# Patient Record
Sex: FEMALE | Race: WHITE | URBAN - METROPOLITAN AREA
[De-identification: names, ages, dates, MRNs, and addresses within clinical notes are randomized per-mention and may not be internally consistent; named-entity substitution may affect disease eponyms.]

---

## 2017-08-06 ENCOUNTER — TRANSFERRED RECORDS (OUTPATIENT)
Dept: HEALTH INFORMATION MANAGEMENT | Facility: CLINIC | Age: 62
End: 2017-08-06

## 2017-08-21 ENCOUNTER — TRANSFERRED RECORDS (OUTPATIENT)
Dept: HEALTH INFORMATION MANAGEMENT | Facility: CLINIC | Age: 62
End: 2017-08-21

## 2017-08-22 ENCOUNTER — TRANSFERRED RECORDS (OUTPATIENT)
Dept: HEALTH INFORMATION MANAGEMENT | Facility: CLINIC | Age: 62
End: 2017-08-22

## 2017-08-23 ENCOUNTER — TRANSFERRED RECORDS (OUTPATIENT)
Dept: HEALTH INFORMATION MANAGEMENT | Facility: CLINIC | Age: 62
End: 2017-08-23

## 2017-08-29 ENCOUNTER — TRANSFERRED RECORDS (OUTPATIENT)
Dept: HEALTH INFORMATION MANAGEMENT | Facility: CLINIC | Age: 62
End: 2017-08-29

## 2017-09-01 ENCOUNTER — TRANSFERRED RECORDS (OUTPATIENT)
Dept: HEALTH INFORMATION MANAGEMENT | Facility: CLINIC | Age: 62
End: 2017-09-01

## 2017-09-03 ENCOUNTER — TRANSFERRED RECORDS (OUTPATIENT)
Dept: HEALTH INFORMATION MANAGEMENT | Facility: CLINIC | Age: 62
End: 2017-09-03

## 2017-09-05 ENCOUNTER — TRANSFERRED RECORDS (OUTPATIENT)
Dept: HEALTH INFORMATION MANAGEMENT | Facility: CLINIC | Age: 62
End: 2017-09-05

## 2017-09-06 ENCOUNTER — TRANSFERRED RECORDS (OUTPATIENT)
Dept: HEALTH INFORMATION MANAGEMENT | Facility: CLINIC | Age: 62
End: 2017-09-06

## 2017-09-08 ENCOUNTER — TRANSFERRED RECORDS (OUTPATIENT)
Dept: HEALTH INFORMATION MANAGEMENT | Facility: CLINIC | Age: 62
End: 2017-09-08

## 2017-09-12 ENCOUNTER — TELEPHONE (OUTPATIENT)
Dept: ORTHOPEDICS | Facility: CLINIC | Age: 62
End: 2017-09-12

## 2017-09-12 ENCOUNTER — MEDICAL CORRESPONDENCE (OUTPATIENT)
Dept: HEALTH INFORMATION MANAGEMENT | Facility: CLINIC | Age: 62
End: 2017-09-12

## 2017-09-12 NOTE — TELEPHONE ENCOUNTER
Jose RN, states patient has post-op appointment with her ortho surgeon, Dr Toledo, on Thursday/Sept 14th.  Office: [phone]  Jose will also be scheduling appointment with Dr Nataly Spangler, Neuro Surgeon  PH: [phone]  PCP Claudette Ta PH: [phone]  Our call center will contact patient on cell: [phone] for demographic information.  Phylicia will see Dr Valentino, Oct 3rd at 9:20 am, for post-op visit and establish care.
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
## 2017-09-12 NOTE — TELEPHONE ENCOUNTER
Call came to triage from Jose in [place] where pt has been seen in the past and he states pt has op notes and images on disk that she will be hand carrying to her clinic appt with Dr Valentino on 10/3/17.

## 2017-09-14 ENCOUNTER — TRANSFERRED RECORDS (OUTPATIENT)
Dept: HEALTH INFORMATION MANAGEMENT | Facility: CLINIC | Age: 62
End: 2017-09-14

## 2017-09-15 ENCOUNTER — TRANSFERRED RECORDS (OUTPATIENT)
Dept: HEALTH INFORMATION MANAGEMENT | Facility: CLINIC | Age: 62
End: 2017-09-15

## 2017-09-27 ASSESSMENT — ENCOUNTER SYMPTOMS
CONSTIPATION: 0
COUGH DISTURBING SLEEP: 0
POLYDIPSIA: 0
NAUSEA: 0
BOWEL INCONTINENCE: 0
FLANK PAIN: 0
WEIGHT LOSS: 0
DIFFICULTY URINATING: 1
STIFFNESS: 0
CHILLS: 0
MUSCLE WEAKNESS: 0
BACK PAIN: 1
JAUNDICE: 0
NECK PAIN: 1
ABDOMINAL PAIN: 0
POLYPHAGIA: 0
JOINT SWELLING: 0
HEMOPTYSIS: 0
HALLUCINATIONS: 0
MUSCLE CRAMPS: 0
NIGHT SWEATS: 0
RECTAL BLEEDING: 0
RECTAL PAIN: 0
COUGH: 0
HEARTBURN: 0
FATIGUE: 0
SPUTUM PRODUCTION: 0
WHEEZING: 0
ALTERED TEMPERATURE REGULATION: 0
DYSPNEA ON EXERTION: 0
SHORTNESS OF BREATH: 1
DECREASED APPETITE: 0
MYALGIAS: 0
SNORES LOUDLY: 0
VOMITING: 0
POSTURAL DYSPNEA: 0
BLOATING: 0
WEIGHT GAIN: 0
BLOOD IN STOOL: 0
RESPIRATORY PAIN: 1
DYSURIA: 1
ARTHRALGIAS: 1
INCREASED ENERGY: 0
FEVER: 0
DIARRHEA: 0

## 2017-09-28 ASSESSMENT — ENCOUNTER SYMPTOMS
WHEEZING: 0
RESPIRATORY PAIN: 1
SPUTUM PRODUCTION: 0
JAUNDICE: 0
HEMOPTYSIS: 0
BLOATING: 0
MYALGIAS: 0
ARTHRALGIAS: 1
HEARTBURN: 0
BACK PAIN: 1
SHORTNESS OF BREATH: 0
HEMATURIA: 0
MUSCLE WEAKNESS: 0
DYSURIA: 1
DIARRHEA: 0
COUGH: 0
RECTAL PAIN: 0
CONSTIPATION: 0
BOWEL INCONTINENCE: 0
FLANK PAIN: 0
ABDOMINAL PAIN: 1
STIFFNESS: 0
NAUSEA: 0
POSTURAL DYSPNEA: 0
DYSPNEA ON EXERTION: 0
BLOOD IN STOOL: 0
VOMITING: 0
MUSCLE CRAMPS: 0
NECK PAIN: 1
COUGH DISTURBING SLEEP: 0
JOINT SWELLING: 0
DIFFICULTY URINATING: 1
RECTAL BLEEDING: 0

## 2017-09-30 ENCOUNTER — HEALTH MAINTENANCE LETTER (OUTPATIENT)
Age: 62
End: 2017-09-30

## 2017-10-02 ENCOUNTER — TRANSFERRED RECORDS (OUTPATIENT)
Dept: HEALTH INFORMATION MANAGEMENT | Facility: CLINIC | Age: 62
End: 2017-10-02

## 2017-10-02 ENCOUNTER — OFFICE VISIT (OUTPATIENT)
Dept: NEUROSURGERY | Facility: CLINIC | Age: 62
End: 2017-10-02

## 2017-10-02 VITALS
HEART RATE: 95 BPM | SYSTOLIC BLOOD PRESSURE: 141 MMHG | WEIGHT: 118 LBS | DIASTOLIC BLOOD PRESSURE: 84 MMHG | HEIGHT: 63 IN | BODY MASS INDEX: 20.91 KG/M2

## 2017-10-02 DIAGNOSIS — S32.010A CLOSED WEDGE COMPRESSION FRACTURE OF FIRST LUMBAR VERTEBRA, INITIAL ENCOUNTER: ICD-10-CM

## 2017-10-02 DIAGNOSIS — S12.9XXA CLOSED FRACTURE OF TRANSVERSE PROCESS OF CERVICAL VERTEBRA, INITIAL ENCOUNTER (H): Primary | ICD-10-CM

## 2017-10-02 RX ORDER — L.ACID,FERM,PLA,RHA/B.BIF,LONG 126 MG
1 TABLET, DELAYED AND EXTENDED RELEASE ORAL DAILY
COMMUNITY

## 2017-10-02 RX ORDER — ACYCLOVIR 200 MG/1
200 CAPSULE ORAL DAILY
COMMUNITY

## 2017-10-02 ASSESSMENT — PAIN SCALES - GENERAL: PAINLEVEL: NO PAIN (0)

## 2017-10-02 NOTE — MR AVS SNAPSHOT
After Visit Summary   10/2/2017    Phylicia Ramirez    MRN: [id number]           Patient Information     Date Of Birth          1955        Visit Information        Provider Department      10/2/2017 11:15 AM Tresa Willis PA-C [facility] Neurosurgery        Today's Diagnoses     Closed fracture of transverse process of cervical vertebra, initial encounter (H)    -  1    Closed wedge compression fracture of first lumbar vertebra, initial encounter (H)           Follow-ups after your visit        Follow-up notes from your care team     Return in about 6 weeks (around 11/13/2017).      Your next 10 appointments already scheduled     Oct 10, 2017  7:50 AM CDT   (Arrive by 7:35 AM)   NEW FOOT/ANKLE with Kemal Valentino MD   [facility] ([facility])    [address] [place] [id number]   [phone]            Nov 14, 2017 11:15 AM CST   (Arrive by 11:00 AM)   XR CERVICAL SPINE 2/3 VIEWS with UCXR1   [facility] ([facility])    [address] [place] [id number] [phone]           Please bring a list of your current medicines to your exam. (Include vitamins, minerals and over-thecounter medicines.) Leave your valuables at home.  Tell your doctor if there is a chance you may be pregnant.  You do not need to do anything special for this exam.            Nov 14, 2017 11:35 AM CST   (Arrive by 11:20 AM)   XR LUMBAR SPINE 2/3 VIEWS with UCXR1   [facility] ([facility])    [address] [place] [id number] [phone]           Please bring a list of your current medicines to your exam. (Include vitamins, minerals and over-thecounter medicines.) Leave your valuables at home.  Tell your doctor if there is a chance you may be pregnant.  You do not need to do anything special for this exam.             Nov 14, 2017 12:00 PM CST   (Arrive by 11:45 AM)   Return Visit with Tresa Willis PA-C   [facility] ([facility])    [address] [id number] [phone]              Future tests that were ordered for you today     Open Future Orders        Priority Expected Expires Ordered    X-ray Cervical spine 2-3 views (AP and lateral) [IMG56] Routine 10/2/2017 10/2/2018 10/2/2017    X-ray Lumbar spine 2-3 views (AP and lateral - standing views preferred) [IMG66] Routine 10/2/2017 10/2/2018 10/2/2017            Who to contact     Please call your clinic at [phone] to:    Ask questions about your health    Make or cancel appointments    Discuss your medicines    Learn about your test results    Speak to your doctor   If you have compliments or concerns about an experience at your clinic, or if you wish to file a complaint, please contact [facility] Patient Relations at [phone] or email us at [email or web address]         Additional Information About Your Visit        [email or web address] Information     [employer] gives you secure access to your electronic health record. If you see a primary care provider, you can also send messages to your care team and make appointments. If you have questions, please call your primary care clinic.  If you do not have a primary care provider, please call [phone] and they will assist you.      [employer] is an electronic gateway that provides easy, online access to your medical records. With [employer], you can request a clinic appointment, read your test results, renew a prescription or communicate with your care team.     To access your existing account, please contact your [facility] or call [phone] for assistance.        Care EveryWhere ID     This is your Care EveryWhere ID. This could be used by other organizations to access your [facility]  "records  [id number]        Your Vitals Were     Pulse Height BMI (Body Mass Index)             95 1.588 m (5' 2.5\") 21.24 kg/m2          Blood Pressure from Last 3 Encounters:   10/02/17 141/84    Weight from Last 3 Encounters:   10/02/17 53.5 kg (118 lb)              We Performed the Following     X-ray Cervical spine 2-3 views (AP and lateral) [IMG56]        Primary Care Provider Office Phone # Fax #    [facility] [phone]       [facility] [address] [place] [address]        Equal Access to Services     [facility] AH: Hadii aad ku hadasho Soomaali, waaxda luqadaha, qaybta kaalmada adeegyada, waxnicolasa ramosin hayestellan adeeg ketan laTutuaan . So [facility] [phone].    ATENCIÓN: Si habla español, tiene a barnes disposición servicios gratuitos de asistencia lingüística. Llame al [phone].    We comply with applicable federal civil rights laws and [place] laws. We do not discriminate on the basis of race, color, national origin, age, disability, sex, sexual orientation, or gender identity.            Thank you!     Thank you for choosing [facility]  for your care. Our goal is always to provide you with excellent care. Hearing back from our patients is one way we can continue to improve our services. Please take a few minutes to complete the written survey that you may receive in the mail after your visit with us. Thank you!             Your Updated Medication List - Protect others around you: Learn how to safely use, store and throw away your medicines at www.disposemymeds.org.          This list is accurate as of: 10/2/17  2:04 PM.  Always use your most recent med list.                   Brand Name Dispense Instructions for use Diagnosis    Acetaminophen 500 MG Tbdp           acyclovir 200 MG capsule    ZOVIRAX          Aspirin 81 MG Tbdp           D-MANNOSE PO           Probiotic Tbec           vitamin C CR 1000 MG Tbcr             "
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
## 2017-10-03 DIAGNOSIS — S92.001A CLOSED DISPLACED FRACTURE OF RIGHT CALCANEUS, UNSPECIFIED PORTION OF CALCANEUS, INITIAL ENCOUNTER: Primary | ICD-10-CM

## 2017-10-10 ENCOUNTER — OFFICE VISIT (OUTPATIENT)
Dept: ORTHOPEDICS | Facility: CLINIC | Age: 62
End: 2017-10-10

## 2017-10-10 DIAGNOSIS — S92.001A CLOSED DISPLACED FRACTURE OF RIGHT CALCANEUS, UNSPECIFIED PORTION OF CALCANEUS, INITIAL ENCOUNTER: Primary | ICD-10-CM

## 2017-10-10 DIAGNOSIS — M25.571 PAIN IN JOINT, ANKLE AND FOOT, RIGHT: ICD-10-CM

## 2017-10-10 DIAGNOSIS — S12.9XXA CLOSED FRACTURE OF TRANSVERSE PROCESS OF CERVICAL VERTEBRA, INITIAL ENCOUNTER (H): Primary | ICD-10-CM

## 2017-10-10 NOTE — MR AVS SNAPSHOT
After Visit Summary   10/10/2017    Phylicia Ramirez    MRN: [id number]           Patient Information     Date Of Birth          1955        Visit Information        Provider Department      10/10/2017 7:50 AM Kemal Valentino MD [facility]        Today's Diagnoses     Closed displaced fracture of right calcaneus, unspecified portion of calcaneus, initial encounter    -  1    Pain in joint, ankle and foot, right           Follow-ups after your visit        Your next 10 appointments already scheduled     Nov 08, 2017  8:00 AM CST   (Arrive by 7:45 AM)   CT NECK ANGIO W/O & W CONTRAST with UCCT1   [facility] CT ([facility])    [address]  1st Floor  [place] [address] [phone]           Please bring any scans or X-rays taken at other hospitals, if similar tests were done. Also bring a list of your medicines, including vitamins, minerals and over-the-counter drugs. It is safest to leave personal items at home.  Be sure to tell your doctor:   If you have any allergies.   If there s any chance you are pregnant.   If you are breastfeeding.   If you have any special needs.  You will have contrast for this exam. To prepare:   Do not eat or drink for 2 hours before your exam. If you need to take medicine, you may take it with small sips of water. (We may ask you to take liquid medicine as well.)   The day before your exam, drink extra fluids at least six 8-ounce glasses (unless your doctor tells you to restrict your fluids).  Patients over 70 or patients with diabetes or kidney problems:   If you haven t had a blood test (creatinine test) within the last 30 days, go to your clinic or Diagnostic Imaging Department for this test.  If you have diabetes:   If your kidney function is normal, continue taking your metformin (Avandamet, Glucophage, Glucovance, Metaglip) on the day of your exam.   If your kidney function is abnormal,  wait 48 hours before restarting this medicine.  Please wear loose clothing, such as a sweat suit or jogging clothes. Avoid snaps, zippers and other metal. We may ask you to undress and put on a hospital gown.  If you have any questions, please call the Imaging Department where you will have your exam.            Nov 08, 2017  8:30 AM CST   (Arrive by 8:15 AM)   XR CERVICAL SPINE 2/3 VIEWS with UCXR1   [facility] ([facility])    [address] [id number] [phone]           Please bring a list of your current medicines to your exam. (Include vitamins, minerals and over-thecounter medicines.) Leave your valuables at home.  Tell your doctor if there is a chance you may be pregnant.  You do not need to do anything special for this exam.            Nov 08, 2017  8:40 AM CST   (Arrive by 8:25 AM)   XR LUMBAR SPINE 2/3 VIEWS with UCXR1   [facility] ([facility])    [address] [id number] [phone]           Please bring a list of your current medicines to your exam. (Include vitamins, minerals and over-thecounter medicines.) Leave your valuables at home.  Tell your doctor if there is a chance you may be pregnant.  You do not need to do anything special for this exam.            Nov 09, 2017 12:00 PM CST   (Arrive by 11:45 AM)   Return Visit with Tresa Willis PA-C   [facility] ([facility])    [address] [place] [address]   [phone]            Nov 21, 2017  8:00 AM CST   CT LOWER EXTREMITY RIGHT W/O CONTRAST with UCCT1   [facility] ([facility])    [address] [id number] [phone]           Please bring any scans or X-rays taken at other hospitals, if similar tests were done. Also bring a list of your medicines,  including vitamins, minerals and over-the-counter drugs. It is safest to leave personal items at home.  Be sure to tell your doctor:   If you have any allergies.   If there s any chance you are pregnant.   If you are breastfeeding.   If you have any special needs.  You do not need to do anything special to prepare.  Please wear loose clothing, such as a sweat suit or jogging clothes. Avoid snaps, zippers and other metal. We may ask you to undress and put on a hospital gown.            Nov 21, 2017  9:10 AM CST   (Arrive by 8:55 AM)   RETURN FOOT/ANKLE with Kemal Valentino MD   [facility] ([facility])    [address]  4th Floor  [facility] [address] [phone]              Who to contact     Please call your clinic at [phone] to:    Ask questions about your health    Make or cancel appointments    Discuss your medicines    Learn about your test results    Speak to your doctor   If you have compliments or concerns about an experience at your clinic, or if you wish to file a complaint, please contact [facility] Patient Relations at [phone] or email us at [email or web address]         Additional Information About Your Visit        [employer] Information     BandPage gives you secure access to your electronic health record. If you see a primary care provider, you can also send messages to your care team and make appointments. If you have questions, please call your primary care clinic.  If you do not have a primary care provider, please call [phone] and they will assist you.      BandPage is an electronic gateway that provides easy, online access to your medical records. With [employer]Page, you can request a clinic appointment, read your test results, renew a prescription or communicate with your care team.     To access your existing account, please contact your [facility] or call  [phone] for assistance.        Care EveryWhere ID     This is your Care EveryWhere ID. This could be used by other organizations to access your [place] medical records  [id number]         Blood Pressure from Last 3 Encounters:   10/02/17 141/84    Weight from Last 3 Encounters:   10/02/17 53.5 kg (118 lb)               Primary Care Provider Office Phone # Fax #    [facility] [phone]       [facility] [address] [place] [address]        Equal Access to Services     [facility]: Hadii aad ku hadasho Soomaali, waaxda luqadaha, qaybta kaalmada adeegyada, waxay idiin hayaan adeeg kharash laTutuaan . So [facility] [phone].    ATENCIÓN: Si habla español, tiene a barnes disposición servicios gratuitos de asistencia lingüística. [facility] [phone].    We comply with applicable federal civil rights laws and [place] laws. We do not discriminate on the basis of race, color, national origin, age, disability, sex, sexual orientation, or gender identity.            Thank you!     Thank you for choosing [facility]  for your care. Our goal is always to provide you with excellent care. Hearing back from our patients is one way we can continue to improve our services. Please take a few minutes to complete the written survey that you may receive in the mail after your visit with us. Thank you!             Your Updated Medication List - Protect others around you: Learn how to safely use, store and throw away your medicines at www.disposemymeds.org.          This list is accurate as of: 10/10/17 11:59 PM.  Always use your most recent med list.                   Brand Name Dispense Instructions for use Diagnosis    Acetaminophen 500 MG Tbdp           acyclovir 200 MG capsule    ZOVIRAX          Aspirin 81 MG Tbdp           D-MANNOSE PO           Probiotic Tbec           vitamin C CR 1000 MG Tbcr
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
## 2017-10-10 NOTE — NURSING NOTE
Reason For Visit:   Chief Complaint   Patient presents with     Musculoskeletal Problem     Right calcaneus fx ORIF. DOS 9/1/17           Pain Assessment  Patient Currently in Pain: No
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
## 2017-11-08 ENCOUNTER — TELEPHONE (OUTPATIENT)
Dept: NEUROSURGERY | Facility: CLINIC | Age: 62
End: 2017-11-08

## 2017-11-08 NOTE — TELEPHONE ENCOUNTER
"Neurosurgery Telephone Note    Was paged regarding Phylicia Ramirez's CT cervical spine findings by reading radiologist with increased distraction of C1 fracture. The patient was contacted by home and mobile telephone with no answer. A voicemail message was left for the patient on her mobile phone noting the urgent findings of her CT and the need to return to the Emergency Department for further management.     Carl \"Naren\" MD Joanie   Neurosurgery, PGY-1    Please contact neurosurgery resident on call with questions.    Dial * * [phone], enter [phone] when prompted.     "

## 2017-11-09 ENCOUNTER — OFFICE VISIT (OUTPATIENT)
Dept: NEUROSURGERY | Facility: CLINIC | Age: 62
End: 2017-11-09

## 2017-11-09 VITALS
OXYGEN SATURATION: 96 % | DIASTOLIC BLOOD PRESSURE: 83 MMHG | HEART RATE: 92 BPM | HEIGHT: 63 IN | SYSTOLIC BLOOD PRESSURE: 160 MMHG | TEMPERATURE: 98.2 F | WEIGHT: 120.5 LBS | RESPIRATION RATE: 20 BRPM | BODY MASS INDEX: 21.35 KG/M2

## 2017-11-09 DIAGNOSIS — S32.010A CLOSED WEDGE COMPRESSION FRACTURE OF FIRST LUMBAR VERTEBRA, INITIAL ENCOUNTER: ICD-10-CM

## 2017-11-09 DIAGNOSIS — S12.9XXA CLOSED FRACTURE OF TRANSVERSE PROCESS OF CERVICAL VERTEBRA, INITIAL ENCOUNTER (H): Primary | ICD-10-CM

## 2017-11-09 PROBLEM — S12.000A C1 CERVICAL FRACTURE (H): Status: ACTIVE | Noted: 2017-08-22

## 2017-11-09 PROBLEM — N39.0 UTI (URINARY TRACT INFECTION): Status: ACTIVE | Noted: 2017-08-29

## 2017-11-09 PROBLEM — S32.000A LUMBAR COMPRESSION FRACTURE (H): Status: ACTIVE | Noted: 2017-08-26

## 2017-11-09 PROBLEM — S22.49XA MULTIPLE RIB FRACTURES: Status: ACTIVE | Noted: 2017-08-21

## 2017-11-09 PROBLEM — S82.009A PATELLAR FRACTURE: Status: ACTIVE | Noted: 2017-08-22

## 2017-11-09 PROBLEM — Z63.4 DEATH OF HUSBAND: Status: ACTIVE | Noted: 2017-08-26
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
## 2017-11-09 PROBLEM — B00.9: Status: ACTIVE | Noted: 2017-11-09

## 2017-11-09 PROBLEM — S72.413A FEMORAL CONDYLE FRACTURE (H): Status: ACTIVE | Noted: 2017-08-22

## 2017-11-09 PROBLEM — J93.9 PNEUMOTHORAX: Status: ACTIVE | Noted: 2017-08-21

## 2017-11-09 PROBLEM — T07.XXXA MULTIPLE LACERATIONS: Status: ACTIVE | Noted: 2017-08-21

## 2017-11-09 PROBLEM — S92.109A TALUS FRACTURE: Status: ACTIVE | Noted: 2017-08-22

## 2017-11-09 PROBLEM — K66.8 PNEUMOPERITONEUM: Status: ACTIVE | Noted: 2017-08-21

## 2017-11-09 PROBLEM — N39.0 RECURRENT UTI: Status: ACTIVE | Noted: 2017-11-09

## 2017-11-09 PROBLEM — S92.002A LEFT CALCANEAL FRACTURE: Status: ACTIVE | Noted: 2017-08-22
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
## 2017-11-09 ASSESSMENT — PAIN SCALES - GENERAL: PAINLEVEL: NO PAIN (0)
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
## 2017-11-09 NOTE — MR AVS SNAPSHOT
After Visit Summary   11/9/2017    Phylicia Ramirez    MRN: [id number]           Patient Information     Date Of Birth          1955        Visit Information        Provider Department      11/9/2017 12:00 PM Tresa Willis PA-C [facility] Neurosurgery        Today's Diagnoses     Closed fracture of transverse process of cervical vertebra, initial encounter (H)    -  1    Closed wedge compression fracture of first lumbar vertebra, initial encounter (H)           Follow-ups after your visit        Follow-up notes from your care team     Return if symptoms worsen or fail to improve.      Your next 10 appointments already scheduled     Nov 21, 2017  6:00 PM CST   CT LOWER EXTREMITY RIGHT W/O CONTRAST with UCCT1   [facility] [place] [facility] ([facility])    [address] [place] [address] [phone]           Please bring any scans or X-rays taken at other hospitals, if similar tests were done. Also bring a list of your medicines, including vitamins, minerals and over-the-counter drugs. It is safest to leave personal items at home.  Be sure to tell your doctor:   If you have any allergies.   If there s any chance you are pregnant.   If you are breastfeeding.   If you have any special needs.  You do not need to do anything special to prepare.  Please wear loose clothing, such as a sweat suit or jogging clothes. Avoid snaps, zippers and other metal. We may ask you to undress and put on a hospital gown.            Nov 21, 2017  6:40 PM CST   (Arrive by 6:25 PM)   RETURN FOOT/ANKLE with Kemal Valentino MD   [facility] ([facility])    [address] [place] [address]  [address] [facility] [address] [phone]              Who to contact     Please call your clinic at [phone] to:    Ask questions about your health    Make or cancel appointments    Discuss your medicines    Learn about your test  "results    Speak to your doctor   If you have compliments or concerns about an experience at your clinic, or if you wish to file a complaint, please contact [facility] Patient Relations at [phone] or email us at [email or web address]         Additional Information About Your Visit        [employer] Information     [employer] gives you secure access to your electronic health record. If you see a primary care provider, you can also send messages to your care team and make appointments. If you have questions, please call your primary care clinic.  If you do not have a primary care provider, please call [phone] and they will assist you.      [employer] is an electronic gateway that provides easy, online access to your medical records. With [employer], you can request a clinic appointment, read your test results, renew a prescription or communicate with your care team.     To access your existing account, please contact your [facility] or call [phone] for assistance.        Care EveryWhere ID     This is your Care EveryWhere ID. This could be used by other organizations to access your [place] medical records  [id number]        Your Vitals Were     Pulse Temperature Respirations Height Pulse Oximetry Breastfeeding?    92 98.2  F (36.8  C) 20 1.588 m (5' 2.5\") 96% No    BMI (Body Mass Index)                   21.69 kg/m2            Blood Pressure from Last 3 Encounters:   11/09/17 160/83   10/02/17 141/84    Weight from Last 3 Encounters:   11/09/17 54.7 kg (120 lb 8 oz)   10/02/17 53.5 kg (118 lb)              We Performed the Following     X-ray Cervical spine G/E 5 views (AP, lateral, odontoid, flexion, extension) [IMG59]        Primary Care Provider Office Phone # Fax #    [facility] [phone]       [facility] [address] [address]        Equal Access to Services     LATONYA JARAMILLO AH: Hadii aad ku " bautista Veras, cindy ramirezadaha, qaoliviata kaalcides mg, bella rachelin hayaan reneyoung zekenel laTutulucy carmella. So [facility] [phone].    ATENCIÓN: Si habla español, tiene a barnes disposición servicios gratuitos de asistencia lingüística. Sadnrine al [phone].    We comply with applicable federal civil rights laws and [place] laws. We do not discriminate on the basis of race, color, national origin, age, disability, sex, sexual orientation, or gender identity.            Thank you!     Thank you for choosing [facility] NEUROSURGERY  for your care. Our goal is always to provide you with excellent care. Hearing back from our patients is one way we can continue to improve our services. Please take a few minutes to complete the written survey that you may receive in the mail after your visit with us. Thank you!             Your Updated Medication List - Protect others around you: Learn how to safely use, store and throw away your medicines at www.disposemymeds.org.          This list is accurate as of: 11/9/17  4:08 PM.  Always use your most recent med list.                   Brand Name Dispense Instructions for use Diagnosis    Acetaminophen 500 MG Tbdp      Take 1-2 tablets by mouth as needed        acyclovir 200 MG capsule    ZOVIRAX     Take 200 mg by mouth daily        Aspirin 81 MG Tbdp      Take 1 tablet by mouth daily        D-MANNOSE PO      Take 2 tablets by mouth daily        Probiotic Tbec      Take 1 capsule by mouth daily        vitamin C CR 1000 MG Tbcr      Take 1 tablet by mouth daily

## 2017-11-09 NOTE — TELEPHONE ENCOUNTER
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 There does not appear to be evidence of bony contents in the spinal canal at the C1 level despite evidence of increased distraction of the fractures. She attested that there was no evidence of neurologic deficit. As such, she was instructed to keep her follow-up appointment with Maureen Willis.     Carl \"Naren\" MD Joanie   Neurosurgery, PGY-1    Please contact neurosurgery resident on call with questions.    Dial * * [phone], enter [phone] when prompted.     "

## 2017-11-21 ENCOUNTER — OFFICE VISIT (OUTPATIENT)
Dept: ORTHOPEDICS | Facility: CLINIC | Age: 62
End: 2017-11-21

## 2017-11-21 DIAGNOSIS — M25.571 PAIN IN JOINT, ANKLE AND FOOT, RIGHT: Primary | ICD-10-CM

## 2017-11-21 NOTE — MR AVS SNAPSHOT
After Visit Summary   11/21/2017    Phylicia Ramirez    MRN: [id number]           Patient Information     Date Of Birth          1955        Visit Information        Provider Department      11/21/2017 6:40 PM Kemal Valentino MD [facility]        Today's Diagnoses     Pain in joint, ankle and foot, right    -  1       Follow-ups after your visit        Follow-up notes from your care team     Return in about 1 month (around 12/21/2017).      Your next 10 appointments already scheduled     Dec 19, 2017  3:40 PM CST   (Arrive by 3:25 PM)   RETURN FOOT/ANKLE with Kemal Valentino MD   [facility] ([facility])    [address] [address] [phone]              Who to contact     Please call your clinic at [phone] to:    Ask questions about your health    Make or cancel appointments    Discuss your medicines    Learn about your test results    Speak to your doctor   If you have compliments or concerns about an experience at your clinic, or if you wish to file a complaint, please contact [facility] Patient Relations at [phone] or email us at [email or web address]         Additional Information About Your Visit        MyChart Information     [employer] gives you secure access to your electronic health record. If you see a primary care provider, you can also send messages to your care team and make appointments. If you have questions, please call your primary care clinic.  If you do not have a primary care provider, please call [phone] and they will assist you.      [employer] is an electronic gateway that provides easy, online access to your medical records. With [employer], you can request a clinic appointment, read your test results, renew a prescription or communicate with your care team.     To access your existing account, please contact your  [facility] or call [phone] for assistance.        Care EveryWhere ID     This is your Care EveryWhere ID. This could be used by other organizations to access your [place] medical records  [id number]         Blood Pressure from Last 3 Encounters:   11/09/17 160/83   10/02/17 141/84    Weight from Last 3 Encounters:   11/09/17 54.7 kg (120 lb 8 oz)   10/02/17 53.5 kg (118 lb)              Today, you had the following     No orders found for display       Primary Care Provider Office Phone # Fax #    [facility] [phone]       [facility] [address] [place] [address]        Equal Access to Services     ARAMIS JARAMILLO : Hadii aad ku hadasho Soomaali, waaxda luqadaha, qaybta kaalmada adeegyada, waxay idiin hayaan madison kharaalexander mccarthy . So [facility] [phone].    ATENCIÓN: Si habla español, tiene a barnes disposición servicios gratuitos de asistencia lingüística. [facility] [phone].    We comply with applicable federal civil rights laws and [place] laws. We do not discriminate on the basis of race, color, national origin, age, disability, sex, sexual orientation, or gender identity.            Thank you!     Thank you for choosing [facility]TH[facility]  for your care. Our goal is always to provide you with excellent care. Hearing back from our patients is one way we can continue to improve our services. Please take a few minutes to complete the written survey that you may receive in the mail after your visit with us. Thank you!             Your Updated Medication List - Protect others around you: Learn how to safely use, store and throw away your medicines at www.disposemymeds.org.          This list is accurate as of: 11/21/17 11:59 PM.  Always use your most recent med list.                   Brand Name Dispense Instructions for use Diagnosis    Acetaminophen 500 MG Tbdp      Take 1-2 tablets by mouth as needed        acyclovir 200 MG  capsule    ZOVIRAX     Take 200 mg by mouth daily        Aspirin 81 MG Tbdp      Take 1 tablet by mouth daily        D-MANNOSE PO      Take 2 tablets by mouth daily        Probiotic Tbec      Take 1 capsule by mouth daily        vitamin C CR 1000 MG Tbcr      Take 1 tablet by mouth daily
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
## 2017-11-22 NOTE — PROGRESS NOTES
CHIEF COMPLAINT:  Status post right calcaneus open reduction internal fixation performed by an outside provider on 09/01/2017.      HISTORY OF PRESENT ILLNESS:  Ms. Ramirez presents today for further followup in the accompaniment of her daughter.  Denies to have any problems or complaints.  Reports to be compliant.      IMAGING:  A CT scan has been obtained prior to today's visit which is significant for showing further consolidation but still some radiolucency across the calcaneal tuberosity itself.      PHYSICAL EXAMINATION:  On today's exam, she presented with a very good and unremarkable exam.  Presents for full range of motion of the ankle joint.  There is some diffuse swelling across the foot.  There are no signs of infection or inflammation.      ASSESSMENT:  Status post right calcaneus open reduction internal fixation.      PLAN:  I discussed with the patient and her daughter that at this point I think it would be a good idea to start with some weightbearing; however, it is very important not to push through the pain.  The patient will proceed with the use of the CAM walker for comfort purposes around the house and at all times while being outdoors.      The patient will proceed with physical therapy after the next appointment.  The next appointment will be in 4 weeks from now.  At that time, 2 views of the right calcaneus will be obtained.      All questions were answered.  The patient was pleased with discussion.      TT 15 minutes, CT 10 minutes.
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
## 2017-12-14 DIAGNOSIS — S92.011D CLOSED DISPLACED FRACTURE OF BODY OF RIGHT CALCANEUS WITH ROUTINE HEALING, SUBSEQUENT ENCOUNTER: Primary | ICD-10-CM

## 2017-12-19 ENCOUNTER — RADIANT APPOINTMENT (OUTPATIENT)
Dept: GENERAL RADIOLOGY | Facility: CLINIC | Age: 62
End: 2017-12-19
Attending: ORTHOPAEDIC SURGERY
Payer: COMMERCIAL

## 2017-12-19 ENCOUNTER — OFFICE VISIT (OUTPATIENT)
Dept: ORTHOPEDICS | Facility: CLINIC | Age: 62
End: 2017-12-19
Payer: COMMERCIAL

## 2017-12-19 VITALS — WEIGHT: 120 LBS | HEIGHT: 63 IN | BODY MASS INDEX: 21.26 KG/M2

## 2017-12-19 DIAGNOSIS — M25.571 PAIN IN JOINT, ANKLE AND FOOT, RIGHT: Primary | ICD-10-CM

## 2017-12-19 DIAGNOSIS — S92.011D CLOSED DISPLACED FRACTURE OF BODY OF RIGHT CALCANEUS WITH ROUTINE HEALING, SUBSEQUENT ENCOUNTER: ICD-10-CM

## 2017-12-19 RX ORDER — AMLODIPINE BESYLATE 10 MG/1
10 TABLET ORAL
COMMUNITY
Start: 2017-12-14 | End: 2018-12-14

## 2017-12-19 NOTE — MR AVS SNAPSHOT
After Visit Summary   12/19/2017    Phylicia Ramirez    MRN: [id number]           Patient Information     Date Of Birth          1955        Visit Information        Provider Department      12/19/2017 3:40 PM Kemal Valentino MD [facility]        Today's Diagnoses     Pain in joint, ankle and foot, right    -  1       Follow-ups after your visit        Additional Services     PHYSICAL THERAPY REFERRAL (External-Prints)       Physical Therapy Referral                  Your next 10 appointments already scheduled     Dec 27, 2017  9:40 AM CST   (Arrive by 9:25 AM)   CHRISTIANE Extremity with Angel Eubanks PT   [facility] [place] ([place]ST[facility]E [place]  )    [address]  [address]  [place] [address]   [phone]            Jan 03, 2018  9:30 AM CST   CHRISTIANE Extremity with Jayesh Begum PT   [facility] [place] ([facility]STIANE [place]  )    [address]  [address]  [place] [address]   [phone]              Who to contact     Please call your clinic at [phone] to:    Ask questions about your health    Make or cancel appointments    Discuss your medicines    Learn about your test results    Speak to your doctor   If you have compliments or concerns about an experience at your clinic, or if you wish to file a complaint, please contact [facility] Patient Relations at [phone] or email us at [email or web address]         Additional Information About Your Visit        MyChart Information     [employer] gives you secure access to your electronic health record. If you see a primary care provider, you can also send messages to your care team and make appointments. If you have questions, please call your primary care clinic.  If you do not have a primary care provider, please call [phone] and they will assist you.      [employer] is an electronic gateway that provides easy, online access to your  "medical records. With [employer], you can request a clinic appointment, read your test results, renew a prescription or communicate with your care team.     To access your existing account, please contact your [facility] or call [phone] for assistance.        Care EveryWhere ID     This is your Care EveryWhere ID. This could be used by other organizations to access your [place] medical records  [id number]        Your Vitals Were     Height BMI (Body Mass Index)                1.588 m (5' 2.5\") 21.6 kg/m2           Blood Pressure from Last 3 Encounters:   No data found for BP    Weight from Last 3 Encounters:   No data found for Wt              Today, you had the following     No orders found for display         Today's Medication Changes          These changes are accurate as of: 12/19/17 11:59 PM.  If you have any questions, ask your nurse or doctor.               Start taking these medicines.        Dose/Directions    order for DME   Used for:  Pain in joint, ankle and foot, right   Started by:  Kemal Valentino MD        Jobst Compression stockings 15mm Hg   2 pair   Quantity:  2 Units   Refills:  0            Where to get your medicines      Some of these will need a paper prescription and others can be bought over the counter.  Ask your nurse if you have questions.     Bring a paper prescription for each of these medications     order for DME                Primary Care Provider Office Phone # Fax #    [facility] [phone]       [facility] [address] [place] [place] [address]        Equal Access to Services     [facility] AH: Hadii aad ku hadasho Soomaali, waaxda luqadaha, qaybta kaalmada adeegyada, bella mccarthy ah. So [facility] [phone].    ATENCIÓN: Si habla español, tiene a barnes disposición servicios gratuitos de asistencia lingüística. Llame al [phone].    We comply with applicable federal civil rights " laws and [place] laws. We do not discriminate on the basis of race, color, national origin, age, disability, sex, sexual orientation, or gender identity.            Thank you!     Thank you for choosing [facility]  for your care. Our goal is always to provide you with excellent care. Hearing back from our patients is one way we can continue to improve our services. Please take a few minutes to complete the written survey that you may receive in the mail after your visit with us. Thank you!             Your Updated Medication List - Protect others around you: Learn how to safely use, store and throw away your medicines at www.disposemymeds.org.          This list is accurate as of: 12/19/17 11:59 PM.  Always use your most recent med list.                   Brand Name Dispense Instructions for use Diagnosis    Acetaminophen 500 MG Tbdp      Take 1-2 tablets by mouth as needed        acyclovir 200 MG capsule    ZOVIRAX     Take 200 mg by mouth daily        * AMLODIPINE BESYLATE PO           * amLODIPine 10 MG tablet    NORVASC     Take 10 mg by mouth        Aspirin 81 MG Tbdp      Take 1 tablet by mouth daily        D-MANNOSE PO      Take 2 tablets by mouth daily        order for DME     2 Units    Jobst Compression stockings 15mm Hg   2 pair    Pain in joint, ankle and foot, right       Probiotic Tbec      Take 1 capsule by mouth daily        vitamin C CR 1000 MG Tbcr      Take 1 tablet by mouth daily        * Notice:  This list has 2 medication(s) that are the same as other medications prescribed for you. Read the directions carefully, and ask your doctor or other care provider to review them with you.

## 2017-12-19 NOTE — LETTER
12/19/2017       RE: Phylicia Ramirez  [address]  [place] [address]     Dear Colleague,    Thank you for referring your patient, Phylicia Ramirez, to the [facility] at [facility]. Please see a copy of my visit note below.    CHIEF COMPLAINT:  Status post right calcaneus open reduction internal fixation performed in 09/01/2017 by an outside provider.      HISTORY OF PRESENT ILLNESS:  Mrs. Ramirez presents today for further followup.  Reports to be doing well.  Reports to be ambulating without crutches except for long distances.      PHYSICAL EXAMINATION:  On today's visit, she continues having some swelling diffusely through the forefoot and hindfoot area.  Range of motion of the ankle is within normal limits.      RADIOGRAPHIC EVALUATION:  Two views of the right calcaneus were obtained today which were significant for showing still some radiolucency at the level of the fracture line.  Otherwise, she presents with a well-healed fracture with no further collapse when compared to the x-rays from 10/10.      ASSESSMENT:  Status post right calcaneus open reduction internal fixation.      PLAN:  I discussed with the patient to continue with activities as tolerated.  The patient will follow up on a p.r.n. basis.  The patient was encouraged to proceed with the use of compression stockings as well as a prescription for physical therapy for gait training.      The patient was encouraged to visit with us in 2-3 months from today if she does not like the function of her right foot.      TT 15 minutes, CT 10 minutes.             Again, thank you for allowing me to participate in the care of your patient.      Sincerely,    Kemal Valentino MD

## 2017-12-19 NOTE — NURSING NOTE
Reason For Visit:   Chief Complaint   Patient presents with     Surgical Followup     The patient is following up today after a right calcaneus ORIF DOS 9/1/17.       ?  No    Date of surgery: 9/1/17  Type of surgery: ORIF right calcaneus      Pain Assessment  Patient Currently in Pain: Denies

## 2017-12-19 NOTE — PROGRESS NOTES
CHIEF COMPLAINT:  Status post right calcaneus open reduction internal fixation performed in 09/01/2017 by an outside provider.      HISTORY OF PRESENT ILLNESS:  Mrs. Ramirez presents today for further followup.  Reports to be doing well.  Reports to be ambulating without crutches except for long distances.      PHYSICAL EXAMINATION:  On today's visit, she continues having some swelling diffusely through the forefoot and hindfoot area.  Range of motion of the ankle is within normal limits.      RADIOGRAPHIC EVALUATION:  Two views of the right calcaneus were obtained today which were significant for showing still some radiolucency at the level of the fracture line.  Otherwise, she presents with a well-healed fracture with no further collapse when compared to the x-rays from 10/10.      ASSESSMENT:  Status post right calcaneus open reduction internal fixation.      PLAN:  I discussed with the patient to continue with activities as tolerated.  The patient will follow up on a p.r.n. basis.  The patient was encouraged to proceed with the use of compression stockings as well as a prescription for physical therapy for gait training.      The patient was encouraged to visit with us in 2-3 months from today if she does not like the function of her right foot.      TT 15 minutes, CT 10 minutes.

## 2017-12-27 ENCOUNTER — THERAPY VISIT (OUTPATIENT)
Dept: PHYSICAL THERAPY | Facility: CLINIC | Age: 62
End: 2017-12-27
Payer: COMMERCIAL

## 2017-12-27 DIAGNOSIS — Z47.89 AFTERCARE FOLLOWING SURGERY OF THE MUSCULOSKELETAL SYSTEM: ICD-10-CM

## 2017-12-27 DIAGNOSIS — M25.571 PAIN IN JOINT INVOLVING ANKLE AND FOOT, RIGHT: Primary | ICD-10-CM

## 2017-12-27 PROCEDURE — 97161 PT EVAL LOW COMPLEX 20 MIN: CPT | Mod: GP | Performed by: PHYSICAL THERAPIST

## 2017-12-27 PROCEDURE — 97110 THERAPEUTIC EXERCISES: CPT | Mod: GP | Performed by: PHYSICAL THERAPIST
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
## 2018-01-03 ENCOUNTER — THERAPY VISIT (OUTPATIENT)
Dept: PHYSICAL THERAPY | Facility: CLINIC | Age: 63
End: 2018-01-03
Payer: COMMERCIAL

## 2018-01-03 DIAGNOSIS — M25.571 PAIN IN JOINT INVOLVING ANKLE AND FOOT, RIGHT: ICD-10-CM

## 2018-01-03 DIAGNOSIS — Z47.89 AFTERCARE FOLLOWING SURGERY OF THE MUSCULOSKELETAL SYSTEM: ICD-10-CM

## 2018-01-03 PROCEDURE — 97110 THERAPEUTIC EXERCISES: CPT | Mod: GP | Performed by: PHYSICAL THERAPIST

## 2018-01-03 PROCEDURE — 97112 NEUROMUSCULAR REEDUCATION: CPT | Mod: GP | Performed by: PHYSICAL THERAPIST

## 2018-01-10 ENCOUNTER — THERAPY VISIT (OUTPATIENT)
Dept: PHYSICAL THERAPY | Facility: CLINIC | Age: 63
End: 2018-01-10
Payer: COMMERCIAL

## 2018-01-10 DIAGNOSIS — M25.571 PAIN IN JOINT INVOLVING ANKLE AND FOOT, RIGHT: ICD-10-CM

## 2018-01-10 DIAGNOSIS — Z47.89 AFTERCARE FOLLOWING SURGERY OF THE MUSCULOSKELETAL SYSTEM: ICD-10-CM

## 2018-01-10 PROCEDURE — 97112 NEUROMUSCULAR REEDUCATION: CPT | Mod: GP | Performed by: PHYSICAL THERAPIST

## 2018-01-10 PROCEDURE — 97110 THERAPEUTIC EXERCISES: CPT | Mod: GP | Performed by: PHYSICAL THERAPIST

## 2018-01-15 ENCOUNTER — THERAPY VISIT (OUTPATIENT)
Dept: PHYSICAL THERAPY | Facility: CLINIC | Age: 63
End: 2018-01-15
Payer: COMMERCIAL

## 2018-01-15 DIAGNOSIS — M25.571 PAIN IN JOINT INVOLVING ANKLE AND FOOT, RIGHT: ICD-10-CM

## 2018-01-15 DIAGNOSIS — Z47.89 AFTERCARE FOLLOWING SURGERY OF THE MUSCULOSKELETAL SYSTEM: ICD-10-CM

## 2018-01-15 PROCEDURE — 97110 THERAPEUTIC EXERCISES: CPT | Mod: GP | Performed by: PHYSICAL THERAPIST

## 2018-01-15 PROCEDURE — 97112 NEUROMUSCULAR REEDUCATION: CPT | Mod: GP | Performed by: PHYSICAL THERAPIST

## 2018-01-24 ENCOUNTER — THERAPY VISIT (OUTPATIENT)
Dept: PHYSICAL THERAPY | Facility: CLINIC | Age: 63
End: 2018-01-24
Payer: COMMERCIAL

## 2018-01-24 DIAGNOSIS — M25.571 PAIN IN JOINT INVOLVING ANKLE AND FOOT, RIGHT: ICD-10-CM

## 2018-01-24 DIAGNOSIS — Z47.89 AFTERCARE FOLLOWING SURGERY OF THE MUSCULOSKELETAL SYSTEM: ICD-10-CM

## 2018-01-24 PROCEDURE — 97110 THERAPEUTIC EXERCISES: CPT | Mod: GP | Performed by: PHYSICAL THERAPIST

## 2018-01-24 PROCEDURE — 97112 NEUROMUSCULAR REEDUCATION: CPT | Mod: GP | Performed by: PHYSICAL THERAPIST

## 2018-01-31 ENCOUNTER — THERAPY VISIT (OUTPATIENT)
Dept: PHYSICAL THERAPY | Facility: CLINIC | Age: 63
End: 2018-01-31
Payer: COMMERCIAL

## 2018-01-31 DIAGNOSIS — Z47.89 AFTERCARE FOLLOWING SURGERY OF THE MUSCULOSKELETAL SYSTEM: ICD-10-CM

## 2018-01-31 DIAGNOSIS — M25.571 PAIN IN JOINT INVOLVING ANKLE AND FOOT, RIGHT: ICD-10-CM

## 2018-01-31 PROCEDURE — 97110 THERAPEUTIC EXERCISES: CPT | Mod: GP | Performed by: PHYSICAL THERAPIST

## 2018-01-31 PROCEDURE — 97112 NEUROMUSCULAR REEDUCATION: CPT | Mod: GP | Performed by: PHYSICAL THERAPIST

## 2018-02-21 ENCOUNTER — THERAPY VISIT (OUTPATIENT)
Dept: PHYSICAL THERAPY | Facility: CLINIC | Age: 63
End: 2018-02-21
Payer: COMMERCIAL

## 2018-02-21 DIAGNOSIS — M25.571 PAIN IN JOINT INVOLVING ANKLE AND FOOT, RIGHT: ICD-10-CM

## 2018-02-21 DIAGNOSIS — Z47.89 AFTERCARE FOLLOWING SURGERY OF THE MUSCULOSKELETAL SYSTEM: ICD-10-CM

## 2018-02-21 PROCEDURE — 97110 THERAPEUTIC EXERCISES: CPT | Mod: GP | Performed by: PHYSICAL THERAPIST

## 2018-02-21 PROCEDURE — 97112 NEUROMUSCULAR REEDUCATION: CPT | Mod: GP | Performed by: PHYSICAL THERAPIST

## 2018-02-21 NOTE — PROGRESS NOTES
Discharge Note    Progress reporting period is from initial eval to Feb 21, 2018.     Phylicia failed to return for next follow up visit and current status is unknown.  Please see information below for last relevant information on current status.  Patient seen for 7 visits.  SUBJECTIVE  Subjective changes noted by patient:  see note  .  Current pain level is  .     Previous pain level was   .   Changes in function:  Yes (See Goal flowsheet attached for changes in current functional level)  Adverse reaction to treatment or activity: None    OBJECTIVE  Gait:    Assistive Devices:  minimally antalgic, no AD  Ankle AROM:    Dorsiflexion: Left:    Right:   10  Plantarflexion: Left:     Right:  50  Inversion: Left:      Right:  25  Eversion:     Right:  15  Ankle Strength:    Dorsiflexion:  Right: 5/5    Pain:  Plantarflexion: Right: 4+/5   Pain  Inversion:Right: 5-/5   Pain:  Eversion:Right: 5-/5   Pain:  Knee ROM:    Extension: Left:    Right:  WNL  Flexion: Left:   Right: WNL  Knee Strength:   Extension:  Right: 5-/5    Pain:  Flexion:  Right: 5-/5    Pain:-    Quad Set Right: Good    Pain:    ASSESSMENT/PLAN  Diagnosis: ORIF R Calcaneus and Patella   DIAGP:  Diagnoses of Pain in joint involving ankle and foot, right and Aftercare following surgery of the musculoskeletal system were pertinent to this visit.  STG/LTGs have been met or progress has been made towards goals:  Yes, please see goal flowsheet for most current information  Assessment of Progress: current status is unknown.    Last current status:     Self Management Plans:  HEP  I have re-evaluated this patient and find that the nature, scope, duration and intensity of the therapy is appropriate for the medical condition of the patient.  Phylicia continues to require the following intervention to meet STG and LTG's:  HEP.    Recommendations:  Discharge with current home program.  Patient to follow up with MD as needed.    Please refer to the daily flowsheet for  treatment today, total treatment time and time spent performing 1:1 timed codes.

## 2019-10-02 ENCOUNTER — HEALTH MAINTENANCE LETTER (OUTPATIENT)
Age: 64
End: 2019-10-02

## 2019-10-30 ENCOUNTER — HEALTH MAINTENANCE LETTER (OUTPATIENT)
Age: 64
End: 2019-10-30

## 2019-12-15 ENCOUNTER — HEALTH MAINTENANCE LETTER (OUTPATIENT)
Age: 64
End: 2019-12-15

## 2021-01-15 ENCOUNTER — HEALTH MAINTENANCE LETTER (OUTPATIENT)
Age: 66
End: 2021-01-15

## 2021-09-04 ENCOUNTER — HEALTH MAINTENANCE LETTER (OUTPATIENT)
Age: 66
End: 2021-09-04

## 2021-10-30 ENCOUNTER — HEALTH MAINTENANCE LETTER (OUTPATIENT)
Age: 66
End: 2021-10-30

## 2022-02-19 ENCOUNTER — HEALTH MAINTENANCE LETTER (OUTPATIENT)
Age: 67
End: 2022-02-19

## 2022-10-22 ENCOUNTER — HEALTH MAINTENANCE LETTER (OUTPATIENT)
Age: 67
End: 2022-10-22

## 2023-04-01 ENCOUNTER — HEALTH MAINTENANCE LETTER (OUTPATIENT)
Age: 68
End: 2023-04-01

## 2023-11-05 ENCOUNTER — HEALTH MAINTENANCE LETTER (OUTPATIENT)
Age: 68
End: 2023-11-05